# Patient Record
Sex: FEMALE | Race: WHITE | NOT HISPANIC OR LATINO | URBAN - METROPOLITAN AREA
[De-identification: names, ages, dates, MRNs, and addresses within clinical notes are randomized per-mention and may not be internally consistent; named-entity substitution may affect disease eponyms.]

---

## 2024-10-23 ENCOUNTER — APPOINTMENT (RX ONLY)
Dept: URBAN - METROPOLITAN AREA CLINIC 28 | Facility: CLINIC | Age: 44
Setting detail: DERMATOLOGY
End: 2024-10-23

## 2024-10-23 DIAGNOSIS — L57.8 OTHER SKIN CHANGES DUE TO CHRONIC EXPOSURE TO NONIONIZING RADIATION: ICD-10-CM

## 2024-10-23 PROCEDURE — ? PHOTO-DOCUMENTATION

## 2024-10-23 PROCEDURE — ? COUNSELING

## 2024-10-23 PROCEDURE — 99204 OFFICE O/P NEW MOD 45 MIN: CPT

## 2024-10-23 PROCEDURE — ? PRESCRIPTION

## 2024-10-23 PROCEDURE — ? PRESCRIPTION MEDICATION MANAGEMENT

## 2024-10-23 RX ORDER — TIRBANIBULIN 10 MG/G
OINTMENT TOPICAL
Qty: 5 | Refills: 0 | Status: ERX | COMMUNITY
Start: 2024-10-23

## 2024-10-23 RX ADMIN — TIRBANIBULIN: 10 OINTMENT TOPICAL at 00:00

## 2024-10-23 NOTE — HPI: OTHER
Please Describe Your Condition:: is a new patient (who seen Dr. Hines many many years ago) who is being seen for a chief complaint of Actinic keratosis. Patient reports that she got the klisyri ointment from a previous dermatologist about two years ago to treat a spot on the right cheek. She did treat that area for five nights two years ago and that seems okay now. But she is noticing some more similar pink red rough spots on her forehead that she thinks might be the same thing. \\n\\n\\nNo history of skin cancer for herself.\\nPatient is not currently pregnant or planning a pregnancy.

## 2024-10-23 NOTE — PROCEDURE: PRESCRIPTION MEDICATION MANAGEMENT
Initiate Treatment: Klisyri 1 % topical ointment in packet Apply to the affected areas of the forehead nightly for 5 nights; wash hands after each use.
Plan: Follow up in 6-8 weeks to recheck the forehead; biopsy spot on the left upper forehead if not gone.
Render In Strict Bullet Format?: No
Detail Level: Zone

## 2024-10-23 NOTE — PROCEDURE: COUNSELING
Patient Specific Counseling (Will Not Stick From Patient To Patient): -\\n\\nA.) Field of Actinic keratosis on the forehead \\n\\nRecommended to treat this area with the klisyri ointment and the patient agrees with this plan. \\nMake sure to wash hands after each use. \\n\\n\\nB.) Left upper forehead -this one spot is a little more red and a little depressed, a little different than the other spots. showed the patient this one with a mirror and discussed it in detail.\\n\\nReviewed that we are concerned that this one might be a pre cancer vs it might be a very early basal cell carcinoma or squamous cell carcinoma. \\nDiscussed biopsy today vs at least try treating it with the klisyri ointment to see if maybe it will go away. Risks and benefits of each option reviewed in detail. \\nReviewed that if it is a basal cell carcinoma or squamous cell carcinoma then klisyri wont make it go away. \\nReviewed that it is very flat and even a little depressed; so we would have to go down a little deeper to get a good sample for the pathologist. \\n\\nPatient declines a biopsy today and would like to at least try treating it with the klisyri ointment first. Reviewed that we need to schedule an appointment in 6-8 weeks to check this spot. \\n\\n\\nRecommended to make a follow up in 6-8 weeks to recheck her forehead and the patient agrees with this plan. \\nApply a thin film of Vaseline around the eyes before applying the klisyri ointment.\\n\\n\\nKlisyri ointment and Prestige information given to the patient.
Detail Level: Detailed